# Patient Record
Sex: FEMALE | URBAN - METROPOLITAN AREA
[De-identification: names, ages, dates, MRNs, and addresses within clinical notes are randomized per-mention and may not be internally consistent; named-entity substitution may affect disease eponyms.]

---

## 2018-09-22 ENCOUNTER — INPATIENT HOSPITAL (OUTPATIENT)
Dept: URBAN - METROPOLITAN AREA HOSPITAL 107 | Facility: HOSPITAL | Age: 33
End: 2018-09-22
Payer: MEDICAID

## 2018-09-22 DIAGNOSIS — R11.2 NAUSEA WITH VOMITING, UNSPECIFIED: ICD-10-CM

## 2018-09-22 DIAGNOSIS — R94.5 ABNORMAL RESULTS OF LIVER FUNCTION STUDIES: ICD-10-CM

## 2018-09-22 PROCEDURE — 99223 1ST HOSP IP/OBS HIGH 75: CPT | Performed by: INTERNAL MEDICINE

## 2018-09-23 ENCOUNTER — INPATIENT HOSPITAL (OUTPATIENT)
Dept: URBAN - METROPOLITAN AREA HOSPITAL 107 | Facility: HOSPITAL | Age: 33
End: 2018-09-23
Payer: MEDICAID

## 2018-09-23 DIAGNOSIS — K81.0 ACUTE CHOLECYSTITIS: ICD-10-CM

## 2018-09-23 DIAGNOSIS — B17.10 ACUTE HEPATITIS C WITHOUT HEPATIC COMA: ICD-10-CM

## 2018-09-23 DIAGNOSIS — R17 UNSPECIFIED JAUNDICE: ICD-10-CM

## 2018-09-23 DIAGNOSIS — B15.9 HEPATITIS A WITHOUT HEPATIC COMA: ICD-10-CM

## 2018-09-23 DIAGNOSIS — F19.10 OTHER PSYCHOACTIVE SUBSTANCE ABUSE, UNCOMPLICATED: ICD-10-CM

## 2018-09-23 PROCEDURE — 99231 SBSQ HOSP IP/OBS SF/LOW 25: CPT | Performed by: INTERNAL MEDICINE

## 2019-02-04 ENCOUNTER — HOSPITAL ENCOUNTER (EMERGENCY)
Facility: HOSPITAL | Age: 34
Discharge: HOME | End: 2019-02-04
Attending: EMERGENCY MEDICINE | Admitting: EMERGENCY MEDICINE

## 2019-02-04 VITALS
BODY MASS INDEX: 26.66 KG/M2 | SYSTOLIC BLOOD PRESSURE: 110 MMHG | DIASTOLIC BLOOD PRESSURE: 66 MMHG | WEIGHT: 160 LBS | HEART RATE: 75 BPM | HEIGHT: 65 IN | OXYGEN SATURATION: 99 % | RESPIRATION RATE: 16 BRPM | TEMPERATURE: 96.9 F

## 2019-02-04 DIAGNOSIS — N91.2 AMENORRHEA: ICD-10-CM

## 2019-02-04 DIAGNOSIS — R10.2 PELVIC PAIN: Primary | ICD-10-CM

## 2019-02-04 DIAGNOSIS — F19.10 IV DRUG ABUSE (HCC): ICD-10-CM

## 2019-02-04 LAB
ALBUMIN SERPL-MCNC: 3.8 G/DL (ref 3.5–5.2)
ALBUMIN/GLOB SERPL: 1.1 G/DL
ALP SERPL-CCNC: 97 U/L (ref 39–117)
ALT SERPL W P-5'-P-CCNC: 30 U/L (ref 1–33)
ANION GAP SERPL CALCULATED.3IONS-SCNC: 11.8 MMOL/L
AST SERPL-CCNC: 25 U/L (ref 1–32)
BACTERIA UR QL AUTO: ABNORMAL /HPF
BASOPHILS # BLD AUTO: 0.02 10*3/MM3 (ref 0–0.2)
BASOPHILS NFR BLD AUTO: 0.4 % (ref 0–1.5)
BILIRUB SERPL-MCNC: 0.4 MG/DL (ref 0.1–1.2)
BILIRUB UR QL STRIP: NEGATIVE
BUN BLD-MCNC: 12 MG/DL (ref 6–20)
BUN/CREAT SERPL: 14.6 (ref 7–25)
CALCIUM SPEC-SCNC: 9 MG/DL (ref 8.6–10.5)
CHLORIDE SERPL-SCNC: 101 MMOL/L (ref 98–107)
CLARITY UR: CLEAR
CLUE CELLS SPEC QL WET PREP: ABNORMAL
CO2 SERPL-SCNC: 28.2 MMOL/L (ref 22–29)
COLOR UR: YELLOW
CREAT BLD-MCNC: 0.82 MG/DL (ref 0.57–1)
DEPRECATED RDW RBC AUTO: 44.5 FL (ref 37–54)
EOSINOPHIL # BLD AUTO: 0.1 10*3/MM3 (ref 0–0.7)
EOSINOPHIL NFR BLD AUTO: 2 % (ref 0.3–6.2)
ERYTHROCYTE [DISTWIDTH] IN BLOOD BY AUTOMATED COUNT: 13.4 % (ref 11.7–13)
GFR SERPL CREATININE-BSD FRML MDRD: 81 ML/MIN/1.73
GLOBULIN UR ELPH-MCNC: 3.4 GM/DL
GLUCOSE BLD-MCNC: 98 MG/DL (ref 65–99)
GLUCOSE UR STRIP-MCNC: NEGATIVE MG/DL
HCG SERPL QL: NEGATIVE
HCT VFR BLD AUTO: 37.4 % (ref 35.6–45.5)
HGB BLD-MCNC: 11.9 G/DL (ref 11.9–15.5)
HGB UR QL STRIP.AUTO: NEGATIVE
HYALINE CASTS UR QL AUTO: ABNORMAL /LPF
HYDATID CYST SPEC WET PREP: ABNORMAL
IMM GRANULOCYTES # BLD AUTO: 0 10*3/MM3 (ref 0–0.03)
IMM GRANULOCYTES NFR BLD AUTO: 0 % (ref 0–0.5)
KETONES UR QL STRIP: NEGATIVE
LEUKOCYTE ESTERASE UR QL STRIP.AUTO: ABNORMAL
LIPASE SERPL-CCNC: 20 U/L (ref 13–60)
LYMPHOCYTES # BLD AUTO: 2.4 10*3/MM3 (ref 0.9–4.8)
LYMPHOCYTES NFR BLD AUTO: 47.4 % (ref 19.6–45.3)
MCH RBC QN AUTO: 29.1 PG (ref 26.9–32)
MCHC RBC AUTO-ENTMCNC: 31.8 G/DL (ref 32.4–36.3)
MCV RBC AUTO: 91.4 FL (ref 80.5–98.2)
MONOCYTES # BLD AUTO: 0.48 10*3/MM3 (ref 0.2–1.2)
MONOCYTES NFR BLD AUTO: 9.5 % (ref 5–12)
NEUTROPHILS # BLD AUTO: 2.06 10*3/MM3 (ref 1.9–8.1)
NEUTROPHILS NFR BLD AUTO: 40.7 % (ref 42.7–76)
NITRITE UR QL STRIP: NEGATIVE
PH UR STRIP.AUTO: 6.5 [PH] (ref 5–8)
PLATELET # BLD AUTO: 243 10*3/MM3 (ref 140–500)
PMV BLD AUTO: 9.9 FL (ref 6–12)
POTASSIUM BLD-SCNC: 4 MMOL/L (ref 3.5–5.2)
PROT SERPL-MCNC: 7.2 G/DL (ref 6–8.5)
PROT UR QL STRIP: NEGATIVE
RBC # BLD AUTO: 4.09 10*6/MM3 (ref 3.9–5.2)
RBC # UR: ABNORMAL /HPF
REF LAB TEST METHOD: ABNORMAL
SODIUM BLD-SCNC: 141 MMOL/L (ref 136–145)
SP GR UR STRIP: 1.02 (ref 1–1.03)
SQUAMOUS #/AREA URNS HPF: ABNORMAL /HPF
T VAGINALIS SPEC QL WET PREP: ABNORMAL
UROBILINOGEN UR QL STRIP: ABNORMAL
WBC NRBC COR # BLD: 5.06 10*3/MM3 (ref 4.5–10.7)
WBC SPEC QL WET PREP: ABNORMAL
WBC UR QL AUTO: ABNORMAL /HPF
YEAST GENITAL QL WET PREP: ABNORMAL

## 2019-02-04 PROCEDURE — 81001 URINALYSIS AUTO W/SCOPE: CPT | Performed by: EMERGENCY MEDICINE

## 2019-02-04 PROCEDURE — 80053 COMPREHEN METABOLIC PANEL: CPT | Performed by: NURSE PRACTITIONER

## 2019-02-04 PROCEDURE — 99283 EMERGENCY DEPT VISIT LOW MDM: CPT

## 2019-02-04 PROCEDURE — 84703 CHORIONIC GONADOTROPIN ASSAY: CPT | Performed by: EMERGENCY MEDICINE

## 2019-02-04 PROCEDURE — 87491 CHLMYD TRACH DNA AMP PROBE: CPT | Performed by: NURSE PRACTITIONER

## 2019-02-04 PROCEDURE — 85025 COMPLETE CBC W/AUTO DIFF WBC: CPT | Performed by: EMERGENCY MEDICINE

## 2019-02-04 PROCEDURE — 87210 SMEAR WET MOUNT SALINE/INK: CPT | Performed by: NURSE PRACTITIONER

## 2019-02-04 PROCEDURE — 83690 ASSAY OF LIPASE: CPT | Performed by: EMERGENCY MEDICINE

## 2019-02-04 PROCEDURE — 87591 N.GONORRHOEAE DNA AMP PROB: CPT | Performed by: NURSE PRACTITIONER

## 2019-02-04 RX ORDER — SODIUM CHLORIDE 0.9 % (FLUSH) 0.9 %
10 SYRINGE (ML) INJECTION AS NEEDED
Status: DISCONTINUED | OUTPATIENT
Start: 2019-02-04 | End: 2019-02-04 | Stop reason: HOSPADM

## 2019-02-04 NOTE — ED NOTES
Pt c/o lower abd pain that started 2wks ago. Pt reports having discharge and took monistat with some relief. Reports increase in urination, pain with intercourse, and amenorrhea. Denies vaginal discharge currently or fever     Noelle Stephens, RN  02/04/19 1527

## 2019-02-04 NOTE — ED PROVIDER NOTES
EMERGENCY DEPARTMENT ENCOUNTER    Room Number:  07/07  Date seen:  2/4/2019  Time seen: 3:25 PM  PCP: Provider, No Known  Historian: Patient, Significant Other      HPI:  Chief Complaint: Abdominal Pain  Context: Aundrea Ramos is a 33 y.o. female who presents to the ED c/o intermittent episodes of lower abdominal pain onset about a month ago. Pt reports that her abdominal pain worsens with sexual intercourse. Pt states that she has also had vaginal discharge for which pt has used Monistat with resolution of the vaginal discharge. Pt denies N/V/D, chest pain, dyspnea, dysuria, and hematuria. LNMP: About 3 months ago; pt states that she recently took a home pregnancy test that was indeterminate. Pt states that she uses IV heroin routinely. There are no other complaints at this time.       ALLERGIES  Patient has no known allergies.    PAST MEDICAL HISTORY  Active Ambulatory Problems     Diagnosis Date Noted   • No Active Ambulatory Problems     Resolved Ambulatory Problems     Diagnosis Date Noted   • No Resolved Ambulatory Problems     Past Medical History:   Diagnosis Date   • Hepatitis A        PAST SURGICAL HISTORY  Past Surgical History:   Procedure Laterality Date   • ABSCESS DRAINAGE      bilateral ac   • APPENDECTOMY     • LASER ABLATION CONDYLOMA CERVICAL / VULVAR         FAMILY HISTORY  History reviewed. No pertinent family history.    SOCIAL HISTORY  Social History     Socioeconomic History   • Marital status: Single     Spouse name: Not on file   • Number of children: Not on file   • Years of education: Not on file   • Highest education level: Not on file   Social Needs   • Financial resource strain: Not on file   • Food insecurity - worry: Not on file   • Food insecurity - inability: Not on file   • Transportation needs - medical: Not on file   • Transportation needs - non-medical: Not on file   Occupational History   • Not on file   Tobacco Use   • Smoking status: Former Smoker     Types: Electronic  Cigarette   Substance and Sexual Activity   • Alcohol use: No     Frequency: Never   • Drug use: Yes     Types: IV     Comment: heroin   • Sexual activity: Yes   Other Topics Concern   • Not on file   Social History Narrative   • Not on file           REVIEW OF SYSTEMS  Review of Systems   Constitutional: Negative for chills.   HENT: Negative for congestion, rhinorrhea and sore throat.    Eyes: Negative for pain.   Respiratory: Negative for cough and shortness of breath.    Cardiovascular: Negative for chest pain, palpitations and leg swelling.   Gastrointestinal: Positive for abdominal pain. Negative for diarrhea, nausea and vomiting.   Genitourinary: Positive for menstrual problem and vaginal discharge. Negative for difficulty urinating, dysuria, flank pain, frequency and hematuria.   Musculoskeletal: Negative for myalgias, neck pain and neck stiffness.   Skin: Negative for rash.   Neurological: Negative for dizziness, speech difficulty, weakness, light-headedness, numbness and headaches.   Psychiatric/Behavioral: Negative.    All other systems reviewed and are negative.          PHYSICAL EXAM  ED Triage Vitals   Temp Heart Rate Resp BP SpO2   02/04/19 1502 02/04/19 1502 02/04/19 1502 02/04/19 1523 02/04/19 1502   96.9 °F (36.1 °C) 98 18 124/84 99 %      Temp src Heart Rate Source Patient Position BP Location FiO2 (%)   02/04/19 1502 02/04/19 1502 -- -- --   Tympanic Monitor        Physical Exam   Constitutional: She is oriented to person, place, and time. No distress.   HENT:   Head: Normocephalic.   Mouth/Throat: Mucous membranes are normal.   Eyes: EOM are normal. Pupils are equal, round, and reactive to light.   Neck: Normal range of motion. Neck supple.   Cardiovascular: Normal rate, regular rhythm and normal heart sounds.   Pulmonary/Chest: Effort normal and breath sounds normal. No respiratory distress. She has no decreased breath sounds. She has no wheezes. She has no rhonchi. She has no rales.    Abdominal: Soft. There is no tenderness. There is no rebound and no guarding.   Genitourinary: Cervix exhibits no motion tenderness. Right adnexum displays no tenderness. Left adnexum displays no tenderness. No vaginal discharge found.   Genitourinary Comments: The cervical os is closed.   Musculoskeletal: Normal range of motion.   Neurological: She is alert and oriented to person, place, and time. She has normal sensation.   Skin: Skin is warm and dry.   Psychiatric: Mood and affect normal.   Nursing note and vitals reviewed.          LAB RESULTS  Recent Results (from the past 24 hour(s))   Lipase    Collection Time: 02/04/19  3:48 PM   Result Value Ref Range    Lipase 20 13 - 60 U/L   hCG, Serum, Qualitative    Collection Time: 02/04/19  3:48 PM   Result Value Ref Range    HCG Qualitative Negative Negative   Urinalysis With Microscopic If Indicated (No Culture) - Urine, Clean Catch    Collection Time: 02/04/19  3:48 PM   Result Value Ref Range    Color, UA Yellow Yellow, Straw    Appearance, UA Clear Clear    pH, UA 6.5 5.0 - 8.0    Specific Gravity, UA 1.020 1.005 - 1.030    Glucose, UA Negative Negative    Ketones, UA Negative Negative    Bilirubin, UA Negative Negative    Blood, UA Negative Negative    Protein, UA Negative Negative    Leuk Esterase, UA Small (1+) (A) Negative    Nitrite, UA Negative Negative    Urobilinogen, UA 2.0 E.U./dL (A) 0.2 - 1.0 E.U./dL   CBC Auto Differential    Collection Time: 02/04/19  3:48 PM   Result Value Ref Range    WBC 5.06 4.50 - 10.70 10*3/mm3    RBC 4.09 3.90 - 5.20 10*6/mm3    Hemoglobin 11.9 11.9 - 15.5 g/dL    Hematocrit 37.4 35.6 - 45.5 %    MCV 91.4 80.5 - 98.2 fL    MCH 29.1 26.9 - 32.0 pg    MCHC 31.8 (L) 32.4 - 36.3 g/dL    RDW 13.4 (H) 11.7 - 13.0 %    RDW-SD 44.5 37.0 - 54.0 fl    MPV 9.9 6.0 - 12.0 fL    Platelets 243 140 - 500 10*3/mm3    Neutrophil % 40.7 (L) 42.7 - 76.0 %    Lymphocyte % 47.4 (H) 19.6 - 45.3 %    Monocyte % 9.5 5.0 - 12.0 %    Eosinophil %  2.0 0.3 - 6.2 %    Basophil % 0.4 0.0 - 1.5 %    Immature Grans % 0.0 0.0 - 0.5 %    Neutrophils, Absolute 2.06 1.90 - 8.10 10*3/mm3    Lymphocytes, Absolute 2.40 0.90 - 4.80 10*3/mm3    Monocytes, Absolute 0.48 0.20 - 1.20 10*3/mm3    Eosinophils, Absolute 0.10 0.00 - 0.70 10*3/mm3    Basophils, Absolute 0.02 0.00 - 0.20 10*3/mm3    Immature Grans, Absolute 0.00 0.00 - 0.03 10*3/mm3   Urinalysis, Microscopic Only - Urine, Clean Catch    Collection Time: 02/04/19  3:48 PM   Result Value Ref Range    RBC, UA 0-2 None Seen, 0-2 /HPF    WBC, UA 3-5 (A) None Seen, 0-2 /HPF    Bacteria, UA None Seen None Seen /HPF    Squamous Epithelial Cells, UA 0-2 None Seen, 0-2 /HPF    Hyaline Casts, UA 0-2 None Seen /LPF    Methodology Automated Microscopy    Comprehensive Metabolic Panel    Collection Time: 02/04/19  3:48 PM   Result Value Ref Range    Glucose 98 65 - 99 mg/dL    BUN 12 6 - 20 mg/dL    Creatinine 0.82 0.57 - 1.00 mg/dL    Sodium 141 136 - 145 mmol/L    Potassium 4.0 3.5 - 5.2 mmol/L    Chloride 101 98 - 107 mmol/L    CO2 28.2 22.0 - 29.0 mmol/L    Calcium 9.0 8.6 - 10.5 mg/dL    Total Protein 7.2 6.0 - 8.5 g/dL    Albumin 3.80 3.50 - 5.20 g/dL    ALT (SGPT) 30 1 - 33 U/L    AST (SGOT) 25 1 - 32 U/L    Alkaline Phosphatase 97 39 - 117 U/L    Total Bilirubin 0.4 0.1 - 1.2 mg/dL    eGFR Non African Amer 81 >60 mL/min/1.73    Globulin 3.4 gm/dL    A/G Ratio 1.1 g/dL    BUN/Creatinine Ratio 14.6 7.0 - 25.0    Anion Gap 11.8 mmol/L   Wet Prep, Genital - Swab, Vagina    Collection Time: 02/04/19  4:07 PM   Result Value Ref Range    YEAST No yeast seen No yeast seen    HYPHAL ELEMENTS No Hyphal elements seen No Hyphal elements seen    WBC'S 1+ WBC's seen (A) No WBC's seen    Clue Cells, Wet Prep No Clue cells seen No Clue cells seen    Trichomonas, Wet Prep No Trichomonas seen No Trichomonas seen       I ordered the above labs and reviewed the results.            MEDICATIONS GIVEN IN ER  Medications   sodium chloride 0.9 %  "flush 10 mL (not administered)           PROCEDURES  Procedures          PROGRESS AND CONSULTS    Progress Notes:         3:33 PM:  GC chlamydia labs ordered for further evaluation.    4:09 PM:  Pelvic exam was performed with female chaperone at pt's bedside.     4:50 PM:  Reviewed pt's history and workup with Dr. Diaz  (ER physician).  After a bedside evaluation, Dr. Diaz agrees with the plan of care.    4:52 PM:  CMP ordered for further evaluation.    Rechecked pt. Informed pt that her UA is negative for acute UTI. CBC and CMP are relatively unremarkable. Pt's HCG qualitative is negative. Pt's GC chlamydia labs are pending - I have offered to order Rocephin to prophylactically treat pt for Chlamydia; however, pt declines Rocephin at this time. Pt was informed of the risks of declining the Rocephin; pt reports that she understands these risks and would like to decline taking the Rocephin currently.    5:34 PM:  Pt will be provided with f/u referral to the Patient Liaison Service and [facility]. RTER warnings given. Pt will be discharged.         Disposition vitals:  /66   Pulse 75   Temp 96.9 °F (36.1 °C) (Tympanic)   Resp 16   Ht 165.1 cm (65\")   Wt 72.6 kg (160 lb)   SpO2 99%   BMI 26.63 kg/m²           DIAGNOSIS  Final diagnoses:   Pelvic pain   Amenorrhea   IV drug abuse (CMS/[facility])           DISPOSITION  Pt discharged.    DISCHARGE    Patient discharged in stable condition.    Reviewed implications of results, diagnosis, meds, responsibility to follow up, warning signs and symptoms of possible worsening, potential complications and reasons to return to ER.    Patient/Family voiced understanding of above instructions.    Discussed plan for discharge, as there is no emergent indication for admission. Pt/family is agreeable and understands need for follow up and repeat testing. Pt is aware that discharge does not mean that nothing is wrong but it indicates no emergency is " present that requires admission and they must continue care with follow-up as given below or physician of their choice.     FOLLOW-UP  PATIENT LIAISON [facility] [address] [phone]  Call today      [facility]  [address] [place]  [facility] [address] [phone]  Call             Documentation assistance provided by ele Patel for CORNELIO Guo.  Information recorded by the ele was done at my direction and has been verified and validated by me.            Verenice Patel  02/05/19 0709       Vy Collins, LOREN  02/08/19 3218

## 2019-02-04 NOTE — DISCHARGE INSTRUCTIONS
Home to rest  Ibuprofen as directed  Follow up with your obgyn   Drink plenty of fluids  Return if worse or new concerns   Continue care with your primary care physician and have your blood pressure regularly checked and managed. Normal blood pressure is 120/80.

## 2019-02-04 NOTE — ED PROVIDER NOTES
MD ATTESTATION NOTE    Pt presents to the ED complaining of lower abd pain and pain w/ intercourse. Pt says her bowel movements are normal.     Reviewed pt's lab and imaging studies, including elevated WBC's. On exam, the pt's abd exam is unremarkable.    I agree with the plan to discharge.    The AUDREY and I have discussed this patient's history, physical exam, and treatment plan.  I have reviewed the documentation and personally had a face to face interaction with the patient. I affirm the documentation and agree with the treatment and plan.  The attached note describes my personal findings.    Documentation assistance provided by ele Cassidy for Dr. Diaz.  Information recorded by the scribe was done at my direction and has been verified and validated by me.         Angela Cassidy  02/04/19 4006       Reji Diaz MD  02/04/19 6561

## 2019-02-07 LAB
C TRACH RRNA SPEC DONR QL NAA+PROBE: NEGATIVE
N GONORRHOEA DNA SPEC QL NAA+PROBE: NEGATIVE

## 2019-11-01 ENCOUNTER — OFFICE VISIT CONVERTED (OUTPATIENT)
Dept: GASTROENTEROLOGY | Facility: HOSPITAL | Age: 34
End: 2019-11-01
Attending: NURSE PRACTITIONER

## 2019-11-01 ENCOUNTER — HOSPITAL ENCOUNTER (OUTPATIENT)
Dept: GASTROENTEROLOGY | Facility: HOSPITAL | Age: 34
Discharge: HOME | End: 2019-11-01
Attending: NURSE PRACTITIONER

## 2019-11-01 LAB
ALBUMIN SERPL-MCNC: 4.3 G/DL (ref 3.5–5)
ALBUMIN/GLOB SERPL: 1.2 {RATIO} (ref 1.4–2.6)
ALP SERPL-CCNC: 75 U/L (ref 53–128)
ALT SERPL-CCNC: 146 U/L (ref 10–40)
ANION GAP SERPL CALC-SCNC: 16 MMOL/L (ref 8–19)
AST SERPL-CCNC: 29 U/L (ref 15–50)
BASOPHILS # BLD AUTO: 0.04 10*3/UL (ref 0–0.2)
BASOPHILS NFR BLD AUTO: 0.6 % (ref 0–3)
BILIRUB SERPL-MCNC: 0.5 MG/DL (ref 0.2–1.3)
BUN SERPL-MCNC: 12 MG/DL (ref 5–25)
BUN/CREAT SERPL: 20 {RATIO} (ref 6–20)
CALCIUM SERPL-MCNC: 9 MG/DL (ref 8.7–10.4)
CHLORIDE SERPL-SCNC: 104 MMOL/L (ref 99–111)
CONV ABS IMM GRAN: 0.03 10*3/UL (ref 0–0.2)
CONV CO2: 24 MMOL/L (ref 22–32)
CONV IMMATURE GRAN: 0.5 % (ref 0–1.8)
CONV TOTAL PROTEIN: 8 G/DL (ref 6.3–8.2)
CREAT UR-MCNC: 0.61 MG/DL (ref 0.7–1.2)
DEPRECATED RDW RBC AUTO: 41.9 FL (ref 35.1–43.9)
EOSINOPHIL # BLD AUTO: 0.1 10*3/UL (ref 0–0.7)
EOSINOPHIL # BLD AUTO: 1.5 % (ref 0–7)
ERYTHROCYTE [DISTWIDTH] IN BLOOD BY AUTOMATED COUNT: 12.4 % (ref 11.6–14.4)
ETHANOL BLD-MCNC: <10 MG/DL
GFR SERPLBLD BASED ON 1.73 SQ M-ARVRAT: >60 ML/MIN/{1.73_M2}
GLOBULIN UR ELPH-MCNC: 3.7 G/DL (ref 2–3.5)
GLUCOSE SERPL-MCNC: 94 MG/DL (ref 70–99)
HCT VFR BLD AUTO: 40.3 % (ref 42–52)
HGB BLD-MCNC: 13.5 G/DL (ref 14–18)
INR PPP: 0.99 (ref 2–3)
LYMPHOCYTES # BLD AUTO: 2.48 10*3/UL (ref 1–5)
LYMPHOCYTES NFR BLD AUTO: 38.1 % (ref 20–45)
MCH RBC QN AUTO: 31 PG (ref 27–31)
MCHC RBC AUTO-ENTMCNC: 33.5 G/DL (ref 33–37)
MCV RBC AUTO: 92.6 FL (ref 80–96)
MONOCYTES # BLD AUTO: 0.52 10*3/UL (ref 0.2–1.2)
MONOCYTES NFR BLD AUTO: 8 % (ref 3–10)
NEUTROPHILS # BLD AUTO: 3.34 10*3/UL (ref 2–8)
NEUTROPHILS NFR BLD AUTO: 51.3 % (ref 30–85)
NRBC CBCN: 0 % (ref 0–0.7)
OSMOLALITY SERPL CALC.SUM OF ELEC: 288 MOSM/KG (ref 273–304)
PLATELET # BLD AUTO: 258 10*3/UL (ref 130–400)
PMV BLD AUTO: 10.2 FL (ref 9.4–12.4)
POTASSIUM SERPL-SCNC: 4.7 MMOL/L (ref 3.5–5.3)
PROTHROMBIN TIME: 10.7 S (ref 9.4–12)
RBC # BLD AUTO: 4.35 10*6/UL (ref 4.7–6.1)
SODIUM SERPL-SCNC: 139 MMOL/L (ref 135–147)
WBC # BLD AUTO: 6.51 10*3/UL (ref 4.8–10.8)

## 2019-11-02 LAB — HBV CORE AB SER DONR QL IA: NEGATIVE

## 2019-11-03 LAB
CONV HEPATITIS C GENOTYPE NOTE: NORMAL
CONV HEPATITIS C TEST INFO: NORMAL
HCV GENTYP SERPL NAA+PROBE: NORMAL
HCV RNA SERPL NAA+PROBE-ACNC: 560 IU/ML
HCV RNA SERPL NAA+PROBE-LOG IU: 2.75 LOG10 IU/ML

## 2019-11-04 LAB
BARBITURATES SERPLBLD QL: NEGATIVE NG/ML
CONV AMPHETAMINE SCREEN (SERUM): NEGATIVE NG/ML
CONV BENZODIAZEPINES SCREEN (SERUM): NEGATIVE NG/ML
CONV BUPRENORPHINE SCREEN (SERUM): NEGATIVE NG/ML
CONV CANNABINOIDS SCREEN (SERUM): NEGATIVE NG/ML
CONV COCAINE SCREEN (SERUM): NEGATIVE NG/ML
CONV METHAMPHETAMINE SCREEN (SERUM): NEGATIVE NG/ML
Lab: NORMAL
METHADONE BLD QL SCN: NEGATIVE NG/ML
OPIATES UR QL SCN: NEGATIVE NG/ML
OXYCODONE SERPL-MCNC: NEGATIVE NG/ML
PCP SPEC-MCNC: NEGATIVE NG/ML

## 2019-11-06 LAB — CONV FIBROSURE HCV: NORMAL

## 2020-08-14 ENCOUNTER — HOSPITAL ENCOUNTER (OUTPATIENT)
Facility: HOSPITAL | Age: 35
Setting detail: HOSPITAL OUTPATIENT SURGERY
End: 2020-08-14
Attending: ORTHOPAEDIC SURGERY | Admitting: ORTHOPAEDIC SURGERY

## 2020-08-19 ENCOUNTER — TRANSCRIBE ORDERS (OUTPATIENT)
Dept: PREADMISSION TESTING | Facility: HOSPITAL | Age: 35
End: 2020-08-19

## 2020-08-19 DIAGNOSIS — Z01.818 OTHER SPECIFIED PRE-OPERATIVE EXAMINATION: Primary | ICD-10-CM

## 2020-08-21 ENCOUNTER — APPOINTMENT (OUTPATIENT)
Dept: PREADMISSION TESTING | Facility: HOSPITAL | Age: 35
End: 2020-08-21

## 2020-08-21 ENCOUNTER — HOSPITAL ENCOUNTER (OUTPATIENT)
Facility: HOSPITAL | Age: 35
Setting detail: HOSPITAL OUTPATIENT SURGERY
End: 2020-08-21
Attending: ORTHOPAEDIC SURGERY | Admitting: ORTHOPAEDIC SURGERY

## 2020-08-26 ENCOUNTER — APPOINTMENT (OUTPATIENT)
Dept: LAB | Facility: HOSPITAL | Age: 35
End: 2020-08-26

## 2020-08-31 ENCOUNTER — HOSPITAL ENCOUNTER (OUTPATIENT)
Facility: HOSPITAL | Age: 35
Setting detail: HOSPITAL OUTPATIENT SURGERY
End: 2020-08-31
Attending: ORTHOPAEDIC SURGERY | Admitting: ORTHOPAEDIC SURGERY

## 2020-09-03 RX ORDER — CEFAZOLIN SODIUM 2 G/100ML
2 INJECTION, SOLUTION INTRAVENOUS ONCE
Status: CANCELLED | OUTPATIENT
Start: 2020-09-03 | End: 2020-09-03

## 2021-05-28 VITALS
BODY MASS INDEX: 29.99 KG/M2 | HEIGHT: 65 IN | WEIGHT: 180 LBS | SYSTOLIC BLOOD PRESSURE: 122 MMHG | DIASTOLIC BLOOD PRESSURE: 73 MMHG

## 2021-05-28 NOTE — PROGRESS NOTES
Patient: HARVEY VARELA     Acct: [id number]     Report: #[id number]  UNIT #: [id number]     : 1985    Encounter Date:2019  PRIMARY CARE:   ***Signed***  --------------------------------------------------------------------------------------------------------------------  DATE: 19      Chief Complaint      HEPATITIS C W/O HEPATIC COMA            Allergies      Coded Allergies:             NO KNOWN ALLERGIES (Unverified , 19)            Medications      Last Reconciled on 19 11:44 by EULA PIMENTEL      Ascorbic Acid (Ascorbic Acid) 100 Mg Tablet      100 MG PO QDAY, #30 TAB         Reported         19            Vitals      Height 5 ft 5 in / 165.1 cm      Weight 180 lbs  / 81.836476 kg      BSA 1.89 m2      BMI 30.0 kg/m2      Blood Pressure 122/73            Yes: Hx Appendectomy, Hx Surgeries (LEEP)      Social History:  Tobacco Use (vape), Recreational Drug use (former)      Smoking status:  Currently Vaping      Counseling given:  Patient declined      Substance use:  Denies use      Medical History:  Yes: Hx Hepatitis (hepatitis c)            PREVENTION      Hx Influenza Vaccination:  No      Influenza Vaccine Declined:  Yes      2 or More Falls Past Year?:  No      Fall Past Year with Injury?:  No      Hx Pneumococcal Vaccination:  No            General:  No Fatigue, No Weight Loss      HEENT:  No Dysphagia, No Visual Changes      Respiratory:  No Cough, No Dyspnea      Cardiology:  No Chest Pain, No Palpitations      Gastrointestinal:  No Diarrhea, No Constipation      Genitourinary:  No Dysuria, No Frequency      Musculoskeletal:  No Joint Tenderness, No Joint Stiffness      Endocrine:  No Cold Intolerance, No Fatigue      Hematologic:  No Bleeding, No Bruising      Psychologic:  No Anxiety, No Depression      Neurologic:  No Confusion, No Weakness      Skin:  No Rash, No Open Wounds            Ms. Varela is a 34-year-old female who presents for evaluation  and treatment of     chronic hepatitis C.  She reports being diagnosed in August.  She states that     she contracted hepatitis A in April and September 2018 and was hospitalized with    both occurrences.  She states that at that time she did not have hepatitis C.      She admits a history of IV drug use.  She states that since being hospitalized     in September 2018 she has used IV drugs.  She is currently clean for the past 3     months.  She denies abdominal pain.  Denies alcohol use.            HEENT:  Atraumatic; No Scleral Icterus      Lungs:  CTAB, Breathing is unlabored      Abdomen:  Normal BS all 4 Quadrants, Soft      Cardiovascular:  Regular Rate and Rhythm; No Murmur      Constitutional:  Healthy appearing; No Acute Distress      Neurological:  Mental Status WNL, Alert+Ox3      Musculoskeletal:  Normal Bulk Strength, Normal Tone      Skin:  No Rash, No Swelling      Rectal:  Deferred            Lab Results      8/26/2019 CBC: Hemoglobin 13, hematocrit 38.9, platelets 26.1.      CMP: Alk phos 83, AST 68, , total bilirubin 0.6.      HIV-nonreactive.      Hepatitis C quant 27,700,000.      Hepatitis A IgM-nonreactive, hepatitis B IgM antibody-nonreactive, hepatitis B     surface antigen-nonreactive, hepatitis C antibody-reactive.            Nicole Stiffness Consistent with:  F2      CAP Score:  Moderate/Severe Liver Fat            Current Plan      Obtain labs today to determine if HCV is chronic.      Chronic hepatitis C         Chronic hepatitis C without hepatic coma         Hepatic coma status: without hepatic coma            Notes      New Medications      * Ascorbic Acid 100 MG TABLET: 100 MG PO QDAY #30      New Diagnostics      * HCV RNA QUANTITATIVE HCPCR, Stat         Dx: Chronic hepatitis C - B18.2      * HEPATITIS C GENOTYPE PCR HEPCT, Stat         Dx: Chronic hepatitis C - B18.2      * Hepatitis B Core Ant, Stat         Dx: Chronic hepatitis C - B18.2      * Alcohol Blood/Ethano, Stat          Dx: Chronic hepatitis C - B18.2      * CBC, Stat         Dx: Chronic hepatitis C - B18.2      * Comp Metabolic Panel, Stat         Dx: Chronic hepatitis C - B18.2      * Fibrosure Hcv, Stat         Dx: Chronic hepatitis C - B18.2      * Drug Screen Serum (9, Stat         Dx: Chronic hepatitis C - B18.2      * PT / INR, Stat         Dx: Chronic hepatitis C - B18.2      * US ABDOMEN LIMITED, SCHEDULED PROCEDURE         Dx: Chronic hepatitis C - B18.2      Patient Education Provided:  Yes      Patient Instructions:  Avoid Alcohol, Avoid Illicit Drug Use, Importance of     keeping appointments      Disposition:  F/U 4 weeks            Electronically signed by Vy Lehman  11/01/2019 11:44       Disclaimer: Converted document may not contain table formatting or lab diagrams. Please see [employer] [facility] for the authenticated document.